# Patient Record
Sex: FEMALE | Race: BLACK OR AFRICAN AMERICAN | NOT HISPANIC OR LATINO | Employment: FULL TIME | URBAN - NONMETROPOLITAN AREA
[De-identification: names, ages, dates, MRNs, and addresses within clinical notes are randomized per-mention and may not be internally consistent; named-entity substitution may affect disease eponyms.]

---

## 2018-07-15 PROBLEM — R19.7 VOMITING AND DIARRHEA: Status: ACTIVE | Noted: 2018-07-15

## 2018-07-15 PROBLEM — R11.10 VOMITING AND DIARRHEA: Status: ACTIVE | Noted: 2018-07-15

## 2018-08-18 PROBLEM — O60.00 PRETERM LABOR: Status: ACTIVE | Noted: 2018-08-18

## 2018-08-18 PROBLEM — O30.043 DICHORIONIC DIAMNIOTIC TWIN PREGNANCY IN THIRD TRIMESTER: Chronic | Status: ACTIVE | Noted: 2018-08-18

## 2018-08-19 PROBLEM — O60.00 PRETERM LABOR: Status: RESOLVED | Noted: 2018-08-18 | Resolved: 2018-08-19

## 2018-08-21 PROBLEM — O47.9 UTERINE CONTRACTIONS DURING PREGNANCY: Status: ACTIVE | Noted: 2018-08-21

## 2018-08-27 PROBLEM — Z34.90 TERM PREGNANCY: Status: ACTIVE | Noted: 2018-08-27

## 2018-08-30 PROBLEM — O30.043 DICHORIONIC DIAMNIOTIC TWIN PREGNANCY IN THIRD TRIMESTER: Chronic | Status: RESOLVED | Noted: 2018-08-18 | Resolved: 2018-08-30

## 2020-07-01 ENCOUNTER — HISTORICAL (OUTPATIENT)
Dept: ADMINISTRATIVE | Facility: HOSPITAL | Age: 35
End: 2020-07-01

## 2021-05-06 DIAGNOSIS — N64.3 GALACTORRHEA: Primary | ICD-10-CM

## 2021-06-03 ENCOUNTER — HOSPITAL ENCOUNTER (OUTPATIENT)
Dept: RADIOLOGY | Facility: HOSPITAL | Age: 36
Discharge: HOME | End: 2021-06-03
Attending: NURSE PRACTITIONER
Payer: MEDICAID

## 2021-06-03 VITALS — WEIGHT: 197 LBS | BODY MASS INDEX: 34.91 KG/M2 | HEIGHT: 63 IN

## 2021-06-03 DIAGNOSIS — N64.3 GALACTORRHEA: ICD-10-CM

## 2021-06-03 PROCEDURE — 77066 DX MAMMO INCL CAD BI: CPT | Mod: TC

## 2022-06-14 ENCOUNTER — OFFICE VISIT (OUTPATIENT)
Dept: URGENT CARE | Facility: CLINIC | Age: 37
End: 2022-06-14
Payer: MEDICAID

## 2022-06-14 VITALS
HEIGHT: 63 IN | RESPIRATION RATE: 20 BRPM | DIASTOLIC BLOOD PRESSURE: 97 MMHG | OXYGEN SATURATION: 97 % | WEIGHT: 199.81 LBS | HEART RATE: 98 BPM | BODY MASS INDEX: 35.4 KG/M2 | SYSTOLIC BLOOD PRESSURE: 136 MMHG | TEMPERATURE: 99 F

## 2022-06-14 DIAGNOSIS — R19.7 DIARRHEA, UNSPECIFIED TYPE: Primary | ICD-10-CM

## 2022-06-14 DIAGNOSIS — R63.4 WEIGHT LOSS: ICD-10-CM

## 2022-06-14 LAB
ALBUMIN SERPL-MCNC: 4.3 GM/DL (ref 3.5–5)
ALBUMIN/GLOB SERPL: 1.3 RATIO (ref 1.1–2)
ALP SERPL-CCNC: 85 UNIT/L (ref 40–150)
ALT SERPL-CCNC: 40 UNIT/L (ref 0–55)
AST SERPL-CCNC: 66 UNIT/L (ref 5–34)
BASOPHILS # BLD AUTO: 0.07 X10(3)/MCL (ref 0–0.2)
BASOPHILS NFR BLD AUTO: 0.6 %
BILIRUBIN DIRECT+TOT PNL SERPL-MCNC: 2.1 MG/DL
BUN SERPL-MCNC: 10.1 MG/DL (ref 7–18.7)
CALCIUM SERPL-MCNC: 9.2 MG/DL (ref 8.4–10.2)
CHLORIDE SERPL-SCNC: 98 MMOL/L (ref 98–107)
CO2 SERPL-SCNC: 25 MMOL/L (ref 22–29)
CREAT SERPL-MCNC: 0.71 MG/DL (ref 0.55–1.02)
EOSINOPHIL # BLD AUTO: 0.06 X10(3)/MCL (ref 0–0.9)
EOSINOPHIL NFR BLD AUTO: 0.5 %
ERYTHROCYTE [DISTWIDTH] IN BLOOD BY AUTOMATED COUNT: 12.5 % (ref 11.5–17)
GLOBULIN SER-MCNC: 3.3 GM/DL (ref 2.4–3.5)
GLUCOSE SERPL-MCNC: 105 MG/DL (ref 74–100)
HCT VFR BLD AUTO: 39.9 % (ref 37–47)
HGB BLD-MCNC: 13.9 GM/DL (ref 12–16)
IMM GRANULOCYTES # BLD AUTO: 0.02 X10(3)/MCL (ref 0–0.02)
IMM GRANULOCYTES NFR BLD AUTO: 0.2 % (ref 0–0.43)
LIPASE SERPL-CCNC: 7 U/L
LYMPHOCYTES # BLD AUTO: 2.05 X10(3)/MCL (ref 0.6–4.6)
LYMPHOCYTES NFR BLD AUTO: 18.7 %
MCH RBC QN AUTO: 33.4 PG (ref 27–31)
MCHC RBC AUTO-ENTMCNC: 34.8 MG/DL (ref 33–36)
MCV RBC AUTO: 95.9 FL (ref 80–94)
MONOCYTES # BLD AUTO: 0.45 X10(3)/MCL (ref 0.1–1.3)
MONOCYTES NFR BLD AUTO: 4.1 %
NEUTROPHILS # BLD AUTO: 8.3 X10(3)/MCL (ref 2.1–9.2)
NEUTROPHILS NFR BLD AUTO: 75.9 %
NRBC BLD AUTO-RTO: 0 %
PLATELET # BLD AUTO: 224 X10(3)/MCL (ref 130–400)
PMV BLD AUTO: 10.5 FL (ref 9.4–12.4)
POTASSIUM SERPL-SCNC: 3.2 MMOL/L (ref 3.5–5.1)
PROT SERPL-MCNC: 7.6 GM/DL (ref 6.4–8.3)
RBC # BLD AUTO: 4.16 X10(6)/MCL (ref 4.2–5.4)
SODIUM SERPL-SCNC: 138 MMOL/L (ref 136–145)
WBC # SPEC AUTO: 11 X10(3)/MCL (ref 4.5–11.5)

## 2022-06-14 PROCEDURE — 99214 OFFICE O/P EST MOD 30 MIN: CPT | Mod: PBBFAC | Performed by: FAMILY MEDICINE

## 2022-06-14 PROCEDURE — 99214 PR OFFICE/OUTPT VISIT, EST, LEVL IV, 30-39 MIN: ICD-10-PCS | Mod: S$PBB,,, | Performed by: FAMILY MEDICINE

## 2022-06-14 PROCEDURE — 85025 COMPLETE CBC W/AUTO DIFF WBC: CPT | Performed by: FAMILY MEDICINE

## 2022-06-14 PROCEDURE — 99214 OFFICE O/P EST MOD 30 MIN: CPT | Mod: S$PBB,,, | Performed by: FAMILY MEDICINE

## 2022-06-14 PROCEDURE — 80053 COMPREHEN METABOLIC PANEL: CPT | Performed by: FAMILY MEDICINE

## 2022-06-14 PROCEDURE — 36415 COLL VENOUS BLD VENIPUNCTURE: CPT | Performed by: FAMILY MEDICINE

## 2022-06-14 PROCEDURE — 83690 ASSAY OF LIPASE: CPT | Performed by: FAMILY MEDICINE

## 2022-06-14 RX ORDER — FLUOXETINE HYDROCHLORIDE 20 MG/1
20 CAPSULE ORAL DAILY
COMMUNITY
Start: 2022-05-23

## 2022-06-14 RX ORDER — DEXTROAMPHETAMINE SULFATE, DEXTROAMPHETAMINE SACCHARATE, AMPHETAMINE SULFATE AND AMPHETAMINE ASPARTATE 7.5; 7.5; 7.5; 7.5 MG/1; MG/1; MG/1; MG/1
CAPSULE, EXTENDED RELEASE ORAL DAILY PRN
COMMUNITY
Start: 2022-06-01

## 2022-06-14 RX ORDER — BUSPIRONE HYDROCHLORIDE 5 MG/1
5 TABLET ORAL 2 TIMES DAILY
COMMUNITY
Start: 2022-05-23

## 2022-06-14 NOTE — LETTER
June 14, 2022      [facility]  [address] [place] [address]  Phone: [phone]       Patient: Jessica Barnett   YOB: 1985  Date of Visit: 06/14/2022    To Whom It May Concern:    Roxy Barnett  was at [facility] on 06/14/2022. The patient may return to work/school on 6/15/2022 with no restrictions. If you have any questions or concerns, or if I can be of further assistance, please do not hesitate to contact me.    Sincerely,    Juanito Bliss MD

## 2022-06-15 NOTE — PROGRESS NOTES
"Subjective:       Patient ID: Jessica Barnett is a 37 y.o. female.    Chief Complaint: Diarrhea (X 3 weeks), Headache, Abdominal Pain (X 3 weeks come and go, with back pain. ), Heartburn (Throat pain and redness), and Weight Loss (Patient states two weeks 222.4 lbs)    37 year old female with abdominal pain, diarrhea, and headache for 3 weeks. She has had a decreased appetite and occasional back pain.  Her stools occasionally float.  It is interfering with her work.  She has also lost over 20lbs in the last 2 weeks.     Review of Systems   Constitutional: Positive for appetite change and unexpected weight change.   Gastrointestinal: Positive for abdominal pain and diarrhea.   Neurological: Positive for headaches.   All other systems reviewed and are negative.        Objective:     Vital Signs  Temp: 99 °F (37.2 °C)  Pulse: 98  Resp: 20  SpO2: 97 %  BP: (!) 136/97  Pain Score: 0-No pain  Height and Weight  Height: 5' 2.6" (159 cm)  Weight: 90.6 kg (199 lb 12.8 oz)  BSA (Calculated - sq m): 2 sq meters  BMI (Calculated): 35.8  Weight in (lb) to have BMI = 25: 139]  Physical Exam  Vitals reviewed.   Constitutional:       Appearance: Normal appearance.   HENT:      Head: Normocephalic and atraumatic.   Eyes:      Extraocular Movements: Extraocular movements intact.      Conjunctiva/sclera: Conjunctivae normal.   Cardiovascular:      Rate and Rhythm: Normal rate and regular rhythm.   Pulmonary:      Effort: Pulmonary effort is normal.      Breath sounds: Normal breath sounds.   Abdominal:      General: Abdomen is flat. There is no distension.      Palpations: Abdomen is soft.      Tenderness: There is no abdominal tenderness. There is no guarding.   Skin:     General: Skin is warm and dry.   Neurological:      General: No focal deficit present.      Mental Status: She is alert and oriented to person, place, and time.   Psychiatric:         Mood and Affect: Mood and affect normal.         Speech: Speech normal.       "   Behavior: Behavior normal. Behavior is cooperative.         Thought Content: Thought content does not include homicidal or suicidal ideation.         Assessment:       Problem List Items Addressed This Visit    None     Visit Diagnoses     Diarrhea, unspecified type    -  Primary    Relevant Orders    CBC Auto Differential    Comprehensive Metabolic Panel    Lipase    Weight loss        Relevant Orders    CBC Auto Differential    Comprehensive Metabolic Panel    Lipase          Plan:           Encouraged increased fluids and bland diet  ER precautions  FU with PCP ASAP

## 2022-06-18 ENCOUNTER — TELEPHONE (OUTPATIENT)
Dept: URGENT CARE | Facility: CLINIC | Age: 37
End: 2022-06-18
Payer: MEDICAID

## 2022-06-18 NOTE — TELEPHONE ENCOUNTER
----- Message from Juanito Bliss MD sent at 6/17/2022  9:02 PM CDT -----  Pt will FU with PCP. Thanks

## 2023-04-07 ENCOUNTER — OFFICE VISIT (OUTPATIENT)
Dept: URGENT CARE | Facility: CLINIC | Age: 38
End: 2023-04-07
Payer: MEDICAID

## 2023-04-07 VITALS
TEMPERATURE: 98 F | HEIGHT: 63 IN | BODY MASS INDEX: 33.38 KG/M2 | DIASTOLIC BLOOD PRESSURE: 83 MMHG | WEIGHT: 188.38 LBS | OXYGEN SATURATION: 100 % | HEART RATE: 68 BPM | RESPIRATION RATE: 18 BRPM | SYSTOLIC BLOOD PRESSURE: 132 MMHG

## 2023-04-07 DIAGNOSIS — R30.0 DYSURIA: Primary | ICD-10-CM

## 2023-04-07 DIAGNOSIS — R10.30 LOWER ABDOMINAL PAIN: ICD-10-CM

## 2023-04-07 LAB
BILIRUB UR QL STRIP: NEGATIVE
C TRACH DNA SPEC QL NAA+PROBE: NOT DETECTED
GLUCOSE UR QL STRIP: NEGATIVE
KETONES UR QL STRIP: NEGATIVE
LEUKOCYTE ESTERASE UR QL STRIP: NEGATIVE
N GONORRHOEA DNA SPEC QL NAA+PROBE: NOT DETECTED
PH, POC UA: 7
POC BLOOD, URINE: NEGATIVE
POC NITRATES, URINE: NEGATIVE
PROT UR QL STRIP: NEGATIVE
SP GR UR STRIP: 1.01 (ref 1–1.03)
UROBILINOGEN UR STRIP-ACNC: NORMAL (ref 0.1–1.1)

## 2023-04-07 PROCEDURE — 99214 PR OFFICE/OUTPT VISIT, EST, LEVL IV, 30-39 MIN: ICD-10-PCS | Mod: S$PBB,,, | Performed by: FAMILY MEDICINE

## 2023-04-07 PROCEDURE — 87491 CHLMYD TRACH DNA AMP PROBE: CPT | Performed by: FAMILY MEDICINE

## 2023-04-07 PROCEDURE — 99214 OFFICE O/P EST MOD 30 MIN: CPT | Mod: S$PBB,,, | Performed by: FAMILY MEDICINE

## 2023-04-07 PROCEDURE — 87591 N.GONORRHOEAE DNA AMP PROB: CPT | Performed by: FAMILY MEDICINE

## 2023-04-07 PROCEDURE — 99214 OFFICE O/P EST MOD 30 MIN: CPT | Mod: PBBFAC | Performed by: FAMILY MEDICINE

## 2023-04-07 PROCEDURE — 81003 URINALYSIS AUTO W/O SCOPE: CPT | Mod: PBBFAC | Performed by: FAMILY MEDICINE

## 2023-04-07 RX ORDER — PHENAZOPYRIDINE HYDROCHLORIDE 200 MG/1
200 TABLET, FILM COATED ORAL 3 TIMES DAILY PRN
Qty: 15 TABLET | Refills: 2 | Status: SHIPPED | OUTPATIENT
Start: 2023-04-07 | End: 2023-04-17

## 2023-04-07 RX ORDER — POLYETHYLENE GLYCOL 3350 17 G/17G
17 POWDER, FOR SOLUTION ORAL DAILY
Qty: 595 G | Refills: 2 | Status: SHIPPED | OUTPATIENT
Start: 2023-04-07

## 2023-04-07 RX ORDER — ERGOCALCIFEROL 1.25 MG/1
50000 CAPSULE ORAL
COMMUNITY
Start: 2023-01-06

## 2023-04-07 RX ORDER — METHIMAZOLE 10 MG/1
10 TABLET ORAL
COMMUNITY
Start: 2023-03-06

## 2023-04-07 NOTE — PROGRESS NOTES
"Subjective:       Patient ID: Jessica Barnett is a 38 y.o. female.    Chief Complaint: Abdominal Pain (RLQ pain. xOne week) and Dysuria (xOne week)      HPI  38-year-old female with right lower quadrant pain and dysuria for 1 week.  Denies high-risk sexual activity.  Denies vaginal discharge or odor.  Review of Systems   Genitourinary:         As above       Objective:       Vital Signs  Temp: 97.9 °F (36.6 °C)  Temp Source: Oral  Pulse: 68  Resp: 18  SpO2: 100 %  BP: 132/83  BP Location: Left arm  Patient Position: Sitting  Pain Score: 0-No pain  Height and Weight  Height: 5' 3" (160 cm)  Weight: 85.5 kg (188 lb 6.4 oz)  BSA (Calculated - sq m): 1.95 sq meters  BMI (Calculated): 33.4  Weight in (lb) to have BMI = 25: 140.8]  Physical Exam  Constitutional:       Appearance: Normal appearance.   HENT:      Head: Normocephalic and atraumatic.   Eyes:      Extraocular Movements: Extraocular movements intact.      Conjunctiva/sclera: Conjunctivae normal.   Cardiovascular:      Rate and Rhythm: Normal rate and regular rhythm.      Heart sounds: Normal heart sounds.   Pulmonary:      Breath sounds: Normal breath sounds.   Abdominal:      General: Bowel sounds are normal. There is no distension.      Tenderness: There is abdominal tenderness (Mild right lower quadrant). There is no guarding or rebound.   Skin:     General: Skin is warm and dry.   Neurological:      General: No focal deficit present.      Mental Status: She is alert.   Psychiatric:         Mood and Affect: Mood and affect normal.         Speech: Speech normal.         Behavior: Behavior normal. Behavior is cooperative.         Thought Content: Thought content does not include homicidal or suicidal ideation.       Assessment:       Problem List Items Addressed This Visit    None  Visit Diagnoses       Dysuria    -  Primary    Relevant Medications    phenazopyridine (PYRIDIUM) 200 MG tablet    Other Relevant Orders    POCT Urinalysis, Dipstick, " Automated, W/O Scope (Completed)    Chlamydia/GC, PCR    Lower abdominal pain        Relevant Medications    polyethylene glycol (GLYCOLAX) 17 gram/dose powder    Other Relevant Orders    POCT Urinalysis, Dipstick, Automated, W/O Scope (Completed)            Plan:   Labs pending   Encouraged increased fluids and fiber   ER precautions  Follow-up with PCP

## 2023-07-31 ENCOUNTER — PATIENT MESSAGE (OUTPATIENT)
Dept: RESEARCH | Facility: HOSPITAL | Age: 38
End: 2023-07-31
Payer: MEDICAID